# Patient Record
Sex: FEMALE | Race: WHITE | Employment: FULL TIME | URBAN - METROPOLITAN AREA
[De-identification: names, ages, dates, MRNs, and addresses within clinical notes are randomized per-mention and may not be internally consistent; named-entity substitution may affect disease eponyms.]

---

## 2017-03-23 PROBLEM — M19.019 AC (ACROMIOCLAVICULAR) ARTHRITIS: Status: ACTIVE | Noted: 2017-03-23

## 2018-07-13 ENCOUNTER — OFFICE VISIT (OUTPATIENT)
Dept: PODIATRY CLINIC | Facility: CLINIC | Age: 38
End: 2018-07-13

## 2018-07-13 DIAGNOSIS — M21.621 TAILOR'S BUNIONETTE, RIGHT: Primary | ICD-10-CM

## 2018-07-13 DIAGNOSIS — M77.51 BURSITIS OF RIGHT FOOT: ICD-10-CM

## 2018-07-13 PROCEDURE — 99212 OFFICE O/P EST SF 10 MIN: CPT | Performed by: PODIATRIST

## 2018-07-14 NOTE — PROGRESS NOTES
Selena Brooks is a 44year old female. Patient presents with: Foot Injury: Pt is here for a right foot injury - follow up visit. Pt had a cortisone injection a few weeks ago for bursitis. No pain.   Pain free for about 2-3 weeks        HPI:   Patient retu heartburn  NEURO: denies headaches    EXAM:   There were no vitals taken for this visit. Physical Exam  GENERAL: well developed, well nourished, in no apparent distress  EXTREMITIES:, Right foot   1.  Integument: Skin on the right foot was examined it is w

## 2018-07-20 PROCEDURE — 88305 TISSUE EXAM BY PATHOLOGIST: CPT | Performed by: RADIOLOGY

## 2018-09-25 ENCOUNTER — APPOINTMENT (OUTPATIENT)
Dept: LAB | Age: 38
End: 2018-09-25
Attending: SURGERY

## 2018-09-25 PROCEDURE — 88342 IMHCHEM/IMCYTCHM 1ST ANTB: CPT

## 2018-09-25 PROCEDURE — 88305 TISSUE EXAM BY PATHOLOGIST: CPT

## 2018-11-07 ENCOUNTER — OFFICE VISIT (OUTPATIENT)
Dept: PODIATRY CLINIC | Facility: CLINIC | Age: 38
End: 2018-11-07
Payer: COMMERCIAL

## 2018-11-07 VITALS — DIASTOLIC BLOOD PRESSURE: 90 MMHG | SYSTOLIC BLOOD PRESSURE: 144 MMHG | HEART RATE: 85 BPM

## 2018-11-07 DIAGNOSIS — M72.2 PLANTAR FASCIITIS OF LEFT FOOT: Primary | ICD-10-CM

## 2018-11-07 PROCEDURE — 20550 NJX 1 TENDON SHEATH/LIGAMENT: CPT | Performed by: PODIATRIST

## 2018-11-07 PROCEDURE — 99213 OFFICE O/P EST LOW 20 MIN: CPT | Performed by: PODIATRIST

## 2018-11-07 RX ORDER — TRIAMCINOLONE ACETONIDE 40 MG/ML
40 INJECTION, SUSPENSION INTRA-ARTICULAR; INTRAMUSCULAR ONCE
Status: SHIPPED | OUTPATIENT
Start: 2018-11-07 | End: 2038-11-07

## 2018-11-08 NOTE — PROGRESS NOTES
Tiesha Dickerson is a 43year old female. Patient presents with:   Follow - Up: left heel spur one month, stretching and icing with no improvement pain 6/10        HPI:   Chief complaint of a painful left heel bothering her now for a few weeks she has been st and Sexual Activity      Alcohol use: Yes        Comment: occasional      Drug use: No      Sexual activity: Not on file    Other Topics      Concerns:        Not on file    Social History Narrative      Not on file          REVIEW OF SYSTEMS:   Review of

## 2018-12-28 ENCOUNTER — TELEPHONE (OUTPATIENT)
Dept: PODIATRY CLINIC | Facility: CLINIC | Age: 38
End: 2018-12-28

## 2018-12-28 NOTE — TELEPHONE ENCOUNTER
Per [facility] ok to add on to 1/2 schedule. Gave pt appt on 1/2/19 @ 12:55pm [facility] office. Need to DB this appt. Will need to get access to DB on 12/31.

## 2018-12-28 NOTE — TELEPHONE ENCOUNTER
Pt states WMN informed her to come see him on Wednesday 1/2/18. No appointments available. Please advise.

## 2019-01-09 ENCOUNTER — TELEPHONE (OUTPATIENT)
Dept: PODIATRY CLINIC | Facility: CLINIC | Age: 39
End: 2019-01-09

## 2019-01-09 ENCOUNTER — OFFICE VISIT (OUTPATIENT)
Dept: PODIATRY CLINIC | Facility: CLINIC | Age: 39
End: 2019-01-09
Payer: COMMERCIAL

## 2019-01-09 DIAGNOSIS — S86.312A PERONEAL TENDON TEAR, LEFT, INITIAL ENCOUNTER: Primary | ICD-10-CM

## 2019-01-09 PROCEDURE — 99213 OFFICE O/P EST LOW 20 MIN: CPT | Performed by: PODIATRIST

## 2019-01-09 RX ORDER — MULTIVIT-MIN/IRON FUM/FOLIC AC 7.5 MG-4
1 TABLET ORAL DAILY
COMMUNITY

## 2019-01-09 RX ORDER — MULTIVIT-MIN/IRON/FOLIC ACID/K 18-600-40
CAPSULE ORAL
COMMUNITY

## 2019-01-09 NOTE — TELEPHONE ENCOUNTER
Dr. Raz Martínez ordered MRI right foot and pt has [employer] PPO. Pt informed this will need PA and a nurse will do this for her.  Advised pt to wait until she hears from nurse if auth or not before scheduling test. Pt will call ortho office to notify of which site s

## 2019-01-09 NOTE — PROGRESS NOTES
Ellie Goldberg is a 43year old female. Patient presents with:  Heel Pain: Left -- Pt states that on 12/22/18 she was walking and felt a pop with instant burning pain. Rates pain 4/10 at this time.          HPI:   Patient presents to the clinic during the h name: Not on file      Number of children: Not on file      Years of education: Not on file      Highest education level: Not on file    Tobacco Use      Smoking status: Former Smoker        Quit date: 2013        Years since quittin.0      Smokele will order an MRI to see the Promius longus tendon on the left foot. Patient understood she make an appointment to see me after the MRI is completed. The patient indicates understanding of these issues and agrees to the plan. Return for after MRI.

## 2019-01-11 ENCOUNTER — TELEPHONE (OUTPATIENT)
Dept: PODIATRY CLINIC | Facility: CLINIC | Age: 39
End: 2019-01-11

## 2019-01-11 DIAGNOSIS — M79.672 PAIN OF LEFT HEEL: Primary | ICD-10-CM

## 2019-01-15 ENCOUNTER — TELEPHONE (OUTPATIENT)
Dept: ORTHOPEDICS CLINIC | Facility: CLINIC | Age: 39
End: 2019-01-15

## 2019-01-15 NOTE — TELEPHONE ENCOUNTER
Called GREG and s/w Cyndie Gatica and he states pt will require a PA for MRI left foot at [address] [place] [address]. Called Reina and s/w MsEdgar Lars Elisesidney to initiate PA for MRI left foot.  She states pt cannot be found and since pt has commercial plan they should cont

## 2019-01-16 ENCOUNTER — PATIENT MESSAGE (OUTPATIENT)
Dept: PODIATRY CLINIC | Facility: CLINIC | Age: 39
End: 2019-01-16

## 2019-01-16 NOTE — TELEPHONE ENCOUNTER
From: Wicho Mascorro  To: Stephen Sierra DPM  Sent: 1/16/2019 9:10 AM CST  Subject: Visit Sara Chapa,    I had the MRI of my foot yesterday, 1/15, at [facility].  I called my insurance, it was more beneficial for me to have it here t

## 2019-01-18 NOTE — TELEPHONE ENCOUNTER
Spoke to pt and she states that her left foot pain is getting worse. Rates pain 8/10 and pain comes and goes. States she has shooting pains. Outside of ankle has swelling. Took Advil 400 - 600 mg twice this week.  Pt has not been icing because it causes hi

## 2019-01-21 NOTE — TELEPHONE ENCOUNTER
Please call in a prescription for tramadol as follows  Tramadol 50 mg tablets  Dispense 30  Take 1 or 2 by mouth every 4-6 hours as needed for foot pain only no refills authorized generic acceptable

## 2019-01-22 NOTE — TELEPHONE ENCOUNTER
Please sign tramadol order placed in the system. Tried to call in today. Message left with pharmacy for call back.

## 2019-01-23 ENCOUNTER — OFFICE VISIT (OUTPATIENT)
Dept: PODIATRY CLINIC | Facility: CLINIC | Age: 39
End: 2019-01-23
Payer: COMMERCIAL

## 2019-01-23 DIAGNOSIS — M79.672 PAIN OF LEFT HEEL: Primary | ICD-10-CM

## 2019-01-23 DIAGNOSIS — S86.312A PERONEAL TENDON TEAR, LEFT, INITIAL ENCOUNTER: ICD-10-CM

## 2019-01-23 DIAGNOSIS — M72.2 PLANTAR FASCIITIS OF LEFT FOOT: ICD-10-CM

## 2019-01-23 PROCEDURE — 99213 OFFICE O/P EST LOW 20 MIN: CPT | Performed by: PODIATRIST

## 2019-01-23 RX ORDER — TRAMADOL HYDROCHLORIDE 50 MG/1
TABLET ORAL
Qty: 30 TABLET | Refills: 0 | OUTPATIENT
Start: 2019-01-23 | End: 2019-05-20

## 2019-01-23 RX ORDER — TRAMADOL HYDROCHLORIDE 50 MG/1
TABLET ORAL
Refills: 0 | COMMUNITY
Start: 2019-01-22 | End: 2019-05-20

## 2019-01-27 NOTE — PROGRESS NOTES
Riley Wilson is a 43year old female. Patient presents with: Foot Pain: Left f/u and MRI results - states she has pain rated as 8/10 on and off         HPI:   Patient returns to the clinic for follow-up on her left foot pain and MRI results.   Patient is Highest education level: Not on file    Tobacco Use      Smoking status: Former Smoker        Quit date: 2013        Years since quittin.0      Smokeless tobacco: Never Used      Tobacco comment: smoked 10 years    Substance and Sexual Activity

## 2019-11-20 PROBLEM — M72.2 PLANTAR FASCIITIS OF LEFT FOOT: Status: ACTIVE | Noted: 2019-11-20

## 2021-10-28 ENCOUNTER — GENETICS ENCOUNTER (OUTPATIENT)
Dept: GENETICS | Facility: HOSPITAL | Age: 41
End: 2021-10-28
Attending: GENETIC COUNSELOR, MS
Payer: COMMERCIAL

## 2021-10-28 ENCOUNTER — APPOINTMENT (OUTPATIENT)
Dept: HEMATOLOGY/ONCOLOGY | Facility: HOSPITAL | Age: 41
End: 2021-10-28
Attending: INTERNAL MEDICINE
Payer: COMMERCIAL

## 2021-10-28 DIAGNOSIS — Z80.3 FAMILY HISTORY OF MALIGNANT NEOPLASM OF BREAST: Primary | ICD-10-CM

## 2021-10-28 PROCEDURE — 96040 HC GENETIC COUNSELING EA 30 MIN: CPT | Performed by: GENETIC COUNSELOR, MS

## 2021-10-28 NOTE — PROGRESS NOTES
Reason for visit: Mrs. Palma is a 54-year-old woman referred for genetic counseling due to a family history of early-onset breast cancer. She was seen today for genetic counseling and to discuss the option of genetic testing.   She has been consistent wit to 12 years use of oral contraceptives. Mrs. Palma admits to former tobacco usage and admits to alcohol consumption of 3-5 drinks weekly. She has not yet had a colonoscopy. She considers herself in overall good health.     Summary:   We discussed tanner benefits and limitations of BRCA1/2 testing versus multi-gene panels that include BRCA1/2.   Panels are an appropriate option for individuals whose history is suggestive of more than one syndrome, and they improve detection rate for identifying the underlyi covered benefit for her. After discussing the multiple testing options, Mrs. Carlos Epstein decided that she would like to continue to consider the option of molecular genetic testing understands that her mother is the more preferred individual to start with.

## 2022-01-04 PROBLEM — M23.92 INTERNAL DERANGEMENT OF KNEE, LEFT: Status: ACTIVE | Noted: 2022-01-04

## 2022-02-21 PROBLEM — Z98.890 S/P LEFT KNEE ARTHROSCOPY: Status: ACTIVE | Noted: 2022-02-21

## 2022-02-21 PROBLEM — S80.02XA CONTUSION OF LEFT KNEE, INITIAL ENCOUNTER: Status: ACTIVE | Noted: 2022-02-21

## 2022-12-15 ENCOUNTER — GENETICS ENCOUNTER (OUTPATIENT)
Dept: GENETICS | Facility: HOSPITAL | Age: 42
End: 2022-12-15
Attending: GENETIC COUNSELOR, MS
Payer: COMMERCIAL

## 2022-12-15 ENCOUNTER — NURSE ONLY (OUTPATIENT)
Dept: HEMATOLOGY/ONCOLOGY | Facility: HOSPITAL | Age: 42
End: 2022-12-15
Attending: GENETIC COUNSELOR, MS
Payer: COMMERCIAL

## 2022-12-15 DIAGNOSIS — Z80.3 FAMILY HISTORY OF MALIGNANT NEOPLASM OF BREAST: Primary | ICD-10-CM

## 2022-12-15 DIAGNOSIS — Z85.820 PERSONAL HISTORY OF MALIGNANT MELANOMA: ICD-10-CM

## 2022-12-15 PROCEDURE — 96040 HC GENETIC COUNSELING EA 30 MIN: CPT | Performed by: GENETIC COUNSELOR, MS

## 2022-12-15 PROCEDURE — 36415 COLL VENOUS BLD VENIPUNCTURE: CPT

## 2022-12-15 NOTE — PROGRESS NOTES
Met with Severino Valenzuela again (originally met 10/28/2021) to review option of genetic testing. She shares that earlier this year she was found to have a melanoma on her back which was surgically resected and more recently, a breast biopsy yielded ADH and she had a lumpectomy, the pathology results of which are pending. She is concerned about her personal risk for breast cancer given her mother's early age at diagnosis and is now desiring to pursue genetic testing, as her mother has not had update testing performed. We reviewed benefits, risks and limitations of the testing as well as various options that exist. She elected to pursue testing through [facility] for a panel of both melanoma genes and breast cancer genes. Blood was drawn and sent to [facility]. Results are anticipated in 2-3 weeks timeframe and she will be contacted with them. Her family history is otherwise unchanged from her last visit. All her questions were answered to the best of my ability.

## 2022-12-28 ENCOUNTER — TELEPHONE (OUTPATIENT)
Dept: GENETICS | Facility: HOSPITAL | Age: 42
End: 2022-12-28

## 2022-12-28 NOTE — TELEPHONE ENCOUNTER
Shared NEGATIVE genetic testing results with Rosa Isela Wilcox over phone. She had opted for a breast and melanoma focused panel through [facility] and all genes yielded negative results. She was reassured to hear this. Reminded her that she should still be followed closely given unknown etiology of cancer in family. I will mail her a copy of the results and she was appreciative of the call.

## 2023-09-09 RX ORDER — LOSARTAN POTASSIUM 50 MG/1
50 TABLET ORAL NIGHTLY
COMMUNITY

## 2023-09-14 ENCOUNTER — ANESTHESIA EVENT (OUTPATIENT)
Dept: SURGERY | Facility: HOSPITAL | Age: 43
End: 2023-09-14
Payer: COMMERCIAL

## 2023-09-14 ENCOUNTER — HOSPITAL ENCOUNTER (OUTPATIENT)
Facility: HOSPITAL | Age: 43
Setting detail: HOSPITAL OUTPATIENT SURGERY
Discharge: HOME | End: 2023-09-14
Attending: ORTHOPAEDIC SURGERY | Admitting: ORTHOPAEDIC SURGERY
Payer: COMMERCIAL

## 2023-09-14 ENCOUNTER — ANESTHESIA (OUTPATIENT)
Dept: SURGERY | Facility: HOSPITAL | Age: 43
End: 2023-09-14
Payer: COMMERCIAL

## 2023-09-14 VITALS
BODY MASS INDEX: 41.95 KG/M2 | SYSTOLIC BLOOD PRESSURE: 151 MMHG | TEMPERATURE: 98 F | DIASTOLIC BLOOD PRESSURE: 94 MMHG | OXYGEN SATURATION: 99 % | WEIGHT: 293 LBS | HEART RATE: 66 BPM | RESPIRATION RATE: 18 BRPM | HEIGHT: 70 IN

## 2023-09-14 LAB — B-HCG UR QL: NEGATIVE

## 2023-09-14 PROCEDURE — 81025 URINE PREGNANCY TEST: CPT

## 2023-09-14 PROCEDURE — 0SBC4ZZ EXCISION OF RIGHT KNEE JOINT, PERCUTANEOUS ENDOSCOPIC APPROACH: ICD-10-PCS | Performed by: ORTHOPAEDIC SURGERY

## 2023-09-14 RX ORDER — SODIUM CHLORIDE, SODIUM LACTATE, POTASSIUM CHLORIDE, CALCIUM CHLORIDE 600; 310; 30; 20 MG/100ML; MG/100ML; MG/100ML; MG/100ML
INJECTION, SOLUTION INTRAVENOUS CONTINUOUS
Status: DISCONTINUED | OUTPATIENT
Start: 2023-09-14 | End: 2023-09-14

## 2023-09-14 RX ORDER — FAMOTIDINE 20 MG/1
20 TABLET, FILM COATED ORAL ONCE
Status: COMPLETED | OUTPATIENT
Start: 2023-09-14 | End: 2023-09-14

## 2023-09-14 RX ORDER — ONDANSETRON 2 MG/ML
4 INJECTION INTRAMUSCULAR; INTRAVENOUS EVERY 6 HOURS PRN
Status: DISCONTINUED | OUTPATIENT
Start: 2023-09-14 | End: 2023-09-14

## 2023-09-14 RX ORDER — ACETAMINOPHEN 500 MG
1000 TABLET ORAL ONCE
Status: COMPLETED | OUTPATIENT
Start: 2023-09-14 | End: 2023-09-14

## 2023-09-14 RX ORDER — DEXAMETHASONE SODIUM PHOSPHATE 4 MG/ML
VIAL (ML) INJECTION AS NEEDED
Status: DISCONTINUED | OUTPATIENT
Start: 2023-09-14 | End: 2023-09-14 | Stop reason: SURG

## 2023-09-14 RX ORDER — METOCLOPRAMIDE 10 MG/1
10 TABLET ORAL ONCE
Status: COMPLETED | OUTPATIENT
Start: 2023-09-14 | End: 2023-09-14

## 2023-09-14 RX ORDER — HYDROMORPHONE HYDROCHLORIDE 1 MG/ML
0.2 INJECTION, SOLUTION INTRAMUSCULAR; INTRAVENOUS; SUBCUTANEOUS EVERY 5 MIN PRN
Status: DISCONTINUED | OUTPATIENT
Start: 2023-09-14 | End: 2023-09-14

## 2023-09-14 RX ORDER — ONDANSETRON 2 MG/ML
INJECTION INTRAMUSCULAR; INTRAVENOUS AS NEEDED
Status: DISCONTINUED | OUTPATIENT
Start: 2023-09-14 | End: 2023-09-14 | Stop reason: SURG

## 2023-09-14 RX ORDER — MORPHINE SULFATE 4 MG/ML
4 INJECTION, SOLUTION INTRAMUSCULAR; INTRAVENOUS EVERY 10 MIN PRN
Status: DISCONTINUED | OUTPATIENT
Start: 2023-09-14 | End: 2023-09-14

## 2023-09-14 RX ORDER — NALOXONE HYDROCHLORIDE 0.4 MG/ML
0.08 INJECTION, SOLUTION INTRAMUSCULAR; INTRAVENOUS; SUBCUTANEOUS AS NEEDED
Status: DISCONTINUED | OUTPATIENT
Start: 2023-09-14 | End: 2023-09-14

## 2023-09-14 RX ORDER — HYDROMORPHONE HYDROCHLORIDE 1 MG/ML
0.4 INJECTION, SOLUTION INTRAMUSCULAR; INTRAVENOUS; SUBCUTANEOUS EVERY 5 MIN PRN
Status: DISCONTINUED | OUTPATIENT
Start: 2023-09-14 | End: 2023-09-14

## 2023-09-14 RX ORDER — KETOROLAC TROMETHAMINE 30 MG/ML
INJECTION, SOLUTION INTRAMUSCULAR; INTRAVENOUS AS NEEDED
Status: DISCONTINUED | OUTPATIENT
Start: 2023-09-14 | End: 2023-09-14 | Stop reason: SURG

## 2023-09-14 RX ORDER — MIDAZOLAM HYDROCHLORIDE 1 MG/ML
INJECTION INTRAMUSCULAR; INTRAVENOUS AS NEEDED
Status: DISCONTINUED | OUTPATIENT
Start: 2023-09-14 | End: 2023-09-14 | Stop reason: SURG

## 2023-09-14 RX ORDER — GLYCOPYRROLATE 0.2 MG/ML
INJECTION, SOLUTION INTRAMUSCULAR; INTRAVENOUS AS NEEDED
Status: DISCONTINUED | OUTPATIENT
Start: 2023-09-14 | End: 2023-09-14 | Stop reason: SURG

## 2023-09-14 RX ORDER — LIDOCAINE HYDROCHLORIDE 10 MG/ML
INJECTION, SOLUTION EPIDURAL; INFILTRATION; INTRACAUDAL; PERINEURAL AS NEEDED
Status: DISCONTINUED | OUTPATIENT
Start: 2023-09-14 | End: 2023-09-14 | Stop reason: SURG

## 2023-09-14 RX ORDER — HYDROMORPHONE HYDROCHLORIDE 1 MG/ML
0.6 INJECTION, SOLUTION INTRAMUSCULAR; INTRAVENOUS; SUBCUTANEOUS EVERY 5 MIN PRN
Status: DISCONTINUED | OUTPATIENT
Start: 2023-09-14 | End: 2023-09-14

## 2023-09-14 RX ORDER — CEFAZOLIN SODIUM IN 0.9 % NACL 3 G/100 ML
INTRAVENOUS SOLUTION, PIGGYBACK (ML) INTRAVENOUS AS NEEDED
Status: DISCONTINUED | OUTPATIENT
Start: 2023-09-14 | End: 2023-09-14 | Stop reason: SURG

## 2023-09-14 RX ORDER — MORPHINE SULFATE 10 MG/ML
6 INJECTION, SOLUTION INTRAMUSCULAR; INTRAVENOUS EVERY 10 MIN PRN
Status: DISCONTINUED | OUTPATIENT
Start: 2023-09-14 | End: 2023-09-14

## 2023-09-14 RX ORDER — BUPIVACAINE HYDROCHLORIDE 2.5 MG/ML
INJECTION, SOLUTION EPIDURAL; INFILTRATION; INTRACAUDAL AS NEEDED
Status: DISCONTINUED | OUTPATIENT
Start: 2023-09-14 | End: 2023-09-14 | Stop reason: HOSPADM

## 2023-09-14 RX ORDER — MORPHINE SULFATE 4 MG/ML
2 INJECTION, SOLUTION INTRAMUSCULAR; INTRAVENOUS EVERY 10 MIN PRN
Status: DISCONTINUED | OUTPATIENT
Start: 2023-09-14 | End: 2023-09-14

## 2023-09-14 RX ADMIN — MIDAZOLAM HYDROCHLORIDE 2 MG: 1 INJECTION INTRAMUSCULAR; INTRAVENOUS at 10:32:00

## 2023-09-14 RX ADMIN — LIDOCAINE HYDROCHLORIDE 50 MG: 10 INJECTION, SOLUTION EPIDURAL; INFILTRATION; INTRACAUDAL; PERINEURAL at 10:26:00

## 2023-09-14 RX ADMIN — DEXAMETHASONE SODIUM PHOSPHATE 4 MG: 4 MG/ML VIAL (ML) INJECTION at 10:26:00

## 2023-09-14 RX ADMIN — KETOROLAC TROMETHAMINE 30 MG: 30 INJECTION, SOLUTION INTRAMUSCULAR; INTRAVENOUS at 11:12:00

## 2023-09-14 RX ADMIN — ONDANSETRON 4 MG: 2 INJECTION INTRAMUSCULAR; INTRAVENOUS at 10:26:00

## 2023-09-14 RX ADMIN — SODIUM CHLORIDE, SODIUM LACTATE, POTASSIUM CHLORIDE, CALCIUM CHLORIDE: 600; 310; 30; 20 INJECTION, SOLUTION INTRAVENOUS at 10:24:00

## 2023-09-14 RX ADMIN — GLYCOPYRROLATE 0.2 MG: 0.2 INJECTION, SOLUTION INTRAMUSCULAR; INTRAVENOUS at 10:26:00

## 2023-09-14 RX ADMIN — CEFAZOLIN SODIUM IN 0.9 % NACL 3 G: 3 G/100 ML INTRAVENOUS SOLUTION, PIGGYBACK (ML) INTRAVENOUS at 10:30:00

## 2023-09-14 RX ADMIN — SODIUM CHLORIDE, SODIUM LACTATE, POTASSIUM CHLORIDE, CALCIUM CHLORIDE: 600; 310; 30; 20 INJECTION, SOLUTION INTRAVENOUS at 11:10:00

## 2023-09-14 NOTE — DISCHARGE INSTRUCTIONS
HOME INSTRUCTIONS  Follow up with office as scheduled   Keep dressing dry until Saturday. On Saturday okay to remove dressing. May shower after dressing is removed   Cover incisions with band aids   Weight Bearing as tolerated   Use Crutches if needed   AMBSURG HOME CARE INSTRUCTIONS: POST-OP ANESTHESIA  The medication that you received for sedation or general anesthesia can last up to 24 hours. Your judgment and reflexes may be altered, even if you feel like your normal self. We Recommend:   Do not drive any motor vehicle or bicycle   Avoid mowing the lawn, playing sports, or working with power tools/applicances (power saws, electric knives or mixers)   That you have someone stay with you on your first night home   Do not drink alcohol or take sleeping pills or tranquilizers   Do not sign legal documents within 24 hours of your procedure   If you had a nerve block for your surgery, take extra care not to put any pressure on your arm or hand for 24 hours    It is normal:  For you to have a sore throat if you had a breathing tube during surgery (while you were asleep!). The sore throat should get better within 48 hours. You can gargle with warm salt water (1/2 tsp in 4 oz warm water) or use a throat lozenge for comfort  To feel muscle aches or soreness especially in the abdomen, chest or neck. The achy feeling should go away in the next 24 hours  To feel weak, sleepy or \"wiped out\". Your should start feeling better in the next 24 hours. To experience mild discomforts such as sore lip or tongue, headache, cramps, gas pains or a bloated feeling in your abdomen. To experience mild back pain or soreness for a day or two if you had spinal or epidural anesthesia. If you had laparoscopic surgery, to feel shoulder pain or discomfort on the day of surgery. For some patients to have nausea after surgery/anesthesia    If you feel nausea or experience vomiting:   Try to move around less.    Eat less than usual or drink only liquids until the next morning   Nausea should resolve in about 24 hours    If you have a problem when you are at home:    Call your surgeons office

## 2023-09-14 NOTE — OPERATIVE REPORT
[facility]    PATIENT'S NAME: Antoine Rodrigues   ATTENDING PHYSICIAN: Lisandro Sultana MD   OPERATING PHYSICIAN: Lisandro Sultana MD   PATIENT ACCOUNT#:   [id number]    LOCATION:  [address]  MEDICAL RECORD #:   [id number]       YOB: 1980  ADMISSION DATE:       09/14/2023      OPERATION DATE:  09/14/2023    OPERATIVE REPORT      PREOPERATIVE DIAGNOSIS:  Right knee medial meniscus tear, osteoarthritis, morbid obesity. POSTOPERATIVE DIAGNOSIS:  Right knee medial meniscus tear, osteoarthritis, morbid obesity. PROCEDURE PERFORMED:  Right knee arthroscopy, partial medial meniscectomy, chondroplasty of the patella and trochlea. COMPLICATIONS:  None. ANESTHESIA:  General.    SPECIMENS REMOVED:  None sent to Pathology. IMPLANTS USED:  None. BLOOD LOSS:  10 mL. INDICATIONS:  The patient is a pleasant 56-year-old woman who has failed nonoperative treatment of meniscal tear and arthritis on the right. We had discussed risks and benefits of operative intervention going forward with the surgery to include infection, stiffness, neurovascular injury, anesthetic risk, continued pain, possible need for further surgery, DVT, PE. She understood these risks and decided to proceed. OPERATIVE TECHNIQUE:  After adequate induction of general anesthesia, the right lower extremity was prepped and draped in sterile fashion after the application of a well-padded tourniquet to right upper thigh. Prior to the case, the leg was exsanguinated and tourniquet taken up to 300 mmHg. At this point, a standard anterolateral portal was made. Patellofemoral joint was entered. Medial portal was made under direct visualization. Thorough evaluation of the patellofemoral joint revealed grade 2 and areas of grade chondromalacia of the patella and trochlea. This was debrided back to a stable base with a shaver.   Medial compartment revealed grade 2 chondromalacia and some areas of grade 3 that was lightly debrided as well. There was a tear of the posterior horn at the level of the root. This was debrided to a stable base with biter and shaver. Notch revealed intact ACL and PCL. Lateral compartment revealed no chondromalacia and no meniscal tear. Gutters revealed no loose bodies. At this time, the joint was irrigated thoroughly. All instruments were removed. Portals were closed with 3-0 nylon. Portals were injected with 0.25% Marcaine plain. Sterile dressings were applied. The patient tolerated the procedure well. She was taken to the PACU in stable condition. Please note that prior to the case a time-out was completed, correct site was identified, and preoperative antibiotics were given. Please also note an added level of difficulty was encountered given the patient's morbid obesity and difficulty with positioning and instrumentation. Dictated By Myra Alba MD  d: 09/14/2023 11:20:05  t: 09/14/2023 14:23:49  Roberto Guard [id number]  AES/

## 2023-09-14 NOTE — H&P
History & Physical Examination    Patient Name: Zacarias Esqueda  MRN: [id number]  CSN: [id number]  YOB: 1980    Diagnosis: RIGHT KNEE MMT, OA    Present Illness: FAILED CONSERVATIVE MEASURES    losartan 50 MG Oral Tab, Take 1 tablet (50 mg total) by mouth at bedtime. , Disp: , Rfl: , 9/13/2023 at 2100  Omega-3 Fatty Acids (FISH OIL OR), Take by mouth., Disp: , Rfl: , 9/7/2023  Multiple Vitamins-Minerals (MULTI-VITAMIN/MINERALS) Oral Tab, Take 1 tablet by mouth daily. , Disp: , Rfl: , 9/7/2023      lactated ringers infusion, , Intravenous, Continuous        Allergies: No Known Allergies    Past Medical History:   Diagnosis Date    Cancer [facility] 05/2022    Melanoma Stage 1 Upper left back    High blood pressure      Past Surgical History:   Procedure Laterality Date    ARTHROSCOPY OF JOINT UNLISTED Left     knee scope     HERNIA SURGERY      umbilical     LUMPECTOMY RIGHT  09/2018    Ductal Hyperplasia.  No lymph nodes removed    LUMPECTOMY RIGHT  12/2022    Ductal Hyperplasia    OTHER SURGICAL HISTORY Left 2000, 2003    A-PMM x2    OTHER SURGICAL HISTORY Right 08/2011    wrist ganglion cyst removed    OTHER SURGICAL HISTORY Left 2005, 2007    foot ganglion cyst removed x2    OTHER SURGICAL HISTORY  09/25/2018    Right breast lumpectomy, right bresat deep margin excision    TONSILLECTOMY       Family History   Problem Relation Age of Onset    Diabetes Father     Breast Cancer Mother 52        DCIS, ER+; BRCA1/2 neg 2004    Cancer Maternal Grandmother 79        lung ca; smoker    Cancer Paternal Grandfather 68        leukemia     Social History    Tobacco Use      Smoking status: Former        Types: Cigarettes        Quit date: 1/1/2013        Years since quitting: 10.7      Smokeless tobacco: Never      Tobacco comments: smoked 10 years    Alcohol use: Yes      Comment: occasional/ 1-2 drinks a week       SYSTEM Check if Review is Normal Check if Physical Exam is Normal If not normal, please explain:   HEENT [ x] [ x]    NECK & BACK [ x] [ x]    HEART [ x] [ x]    LUNGS [ x] [ x]    ABDOMEN [ x] [ x]    UROGENITAL [ x] [ x]    EXTREMITIES [ ] [ ] R +MJLT, +Mcmurrays   OTHER        [ x ] I have discussed the risks and benefits and alternatives with the patient/family. They understand and agree to proceed with plan of care. [ x ] I have reviewed the History and Physical done within the last 30 days. Any changes noted above.     PLAN: RIGHT KNEE SCOPE    Tha Coy MD  9/14/2023  9:45 AM

## 2025-04-17 RX ORDER — MULTIVIT-MIN/IRON/FOLIC ACID/K 18-600-40
CAPSULE ORAL
COMMUNITY

## 2025-04-25 ENCOUNTER — HOSPITAL ENCOUNTER (OUTPATIENT)
Facility: HOSPITAL | Age: 45
Setting detail: HOSPITAL OUTPATIENT SURGERY
Discharge: HOME | End: 2025-04-25
Attending: INTERNAL MEDICINE | Admitting: INTERNAL MEDICINE
Payer: COMMERCIAL

## 2025-04-25 ENCOUNTER — ANESTHESIA EVENT (OUTPATIENT)
Dept: ENDOSCOPY | Facility: HOSPITAL | Age: 45
End: 2025-04-25
Payer: COMMERCIAL

## 2025-04-25 ENCOUNTER — ANESTHESIA (OUTPATIENT)
Dept: ENDOSCOPY | Facility: HOSPITAL | Age: 45
End: 2025-04-25
Payer: COMMERCIAL

## 2025-04-25 VITALS
OXYGEN SATURATION: 98 % | HEART RATE: 65 BPM | DIASTOLIC BLOOD PRESSURE: 54 MMHG | RESPIRATION RATE: 21 BRPM | HEIGHT: 70 IN | SYSTOLIC BLOOD PRESSURE: 115 MMHG | BODY MASS INDEX: 41.95 KG/M2 | WEIGHT: 293 LBS

## 2025-04-25 LAB — B-HCG UR QL: NEGATIVE

## 2025-04-25 PROCEDURE — 81025 URINE PREGNANCY TEST: CPT

## 2025-04-25 RX ORDER — LIDOCAINE HYDROCHLORIDE 10 MG/ML
INJECTION, SOLUTION EPIDURAL; INFILTRATION; INTRACAUDAL; PERINEURAL AS NEEDED
Status: DISCONTINUED | OUTPATIENT
Start: 2025-04-25 | End: 2025-04-25 | Stop reason: SURG

## 2025-04-25 RX ORDER — SODIUM CHLORIDE, SODIUM LACTATE, POTASSIUM CHLORIDE, CALCIUM CHLORIDE 600; 310; 30; 20 MG/100ML; MG/100ML; MG/100ML; MG/100ML
INJECTION, SOLUTION INTRAVENOUS CONTINUOUS
Status: DISCONTINUED | OUTPATIENT
Start: 2025-04-25 | End: 2025-04-25

## 2025-04-25 RX ADMIN — LIDOCAINE HYDROCHLORIDE 50 MG: 10 INJECTION, SOLUTION EPIDURAL; INFILTRATION; INTRACAUDAL; PERINEURAL at 08:10:00

## 2025-04-25 NOTE — H&P
HISTORY AND PHYSICAL FOR ENDOSCOPIC PROCEDURES      Lily Palma Patient Status:  Hospital Outpatient Surgery    1980 MRN [id number]   Location [facility] ENDOSCOPY LAB SUITES Attending Milady Aquino MD   Hosp Day # 0 PCP Navarro Linares MD     Date of Initial Consult:  today  Reason for Consultation:  screening    Scheduled Procedure: Colonoscopy  Indications for Procedure: Screening    History of Present Illness:  Lily Palma is a/a(n) 44 year old female who presented with complaints as stated above.    Medical History:  Past Medical History[1]  PtPast Surgical History[2]   reports that she quit smoking about 12 years ago. Her smoking use included cigarettes. She has never used smokeless tobacco. She reports current alcohol use. She reports that she does not use drugs.  Family History[3]    Allergies:  Allergies[4]    Medications:  Current Hospital Medications[5]    Anticoagulants: None    History of Anesthesia Complications: None    Review of Systems:  General: Negative other than what was specified in the HPI  Respiratory: Negative other than what was specified in the HPI  Cardiovascular: Negative other than what was specified in the HPI  Gastrointestinal: Negative other than what was specified in the HPI  Neurological: Negative other than what was specified in the HPI    Physical Exam:  General: AAOx3 in NAD  HEENT: No scleral icterus, no LAD  Lungs: CTA bilaterally, no wheezing or crackles  CV: RRR S1S2, no murmurs or rubs  Abdomen: Normal active bowel sounds, soft, nontender, nondistended, no rebound or guarding  Extremities: No edema or discoloration    Assessment and Plan:  Problem List[6]      A. ASA Classification: 1. Normal healthy patient    B.  The sedation plan was explained to the patient.  The risks, benefits and alternatives to the aforementioned endoscopic evaluation(s), including but not limited to: infection, bleeding, aspiration, perforation, adverse medications reaction,  missed diagnosis and missed lesions, were explained to the patient and/or family and/or POA and they voiced their understanding and agreed to undergo the procedure(s). They were given the opportunity to ask questions and all inquiries were addressed.    C.  Sedation Plan: Monitored Anesthesia Care      Milady Aquino MD  4/25/2025  8:05 AM         [1]   Past Medical History:   Cancer ([facility])    Melanoma Stage 1 Upper left back    High blood pressure    Visual impairment    contacts/glasses   [2]   Past Surgical History:  Procedure Laterality Date    Arthroscopy of joint unlisted Left     knee scope     Hernia surgery      umbilical     Lumpectomy right  09/2018    Ductal Hyperplasia. No lymph nodes removed    Lumpectomy right  12/2022    Ductal Hyperplasia    Other surgical history Left 2000, 2003    A-PMM x2    Other surgical history Right 08/2011    wrist ganglion cyst removed    Other surgical history Left 2005, 2007    foot ganglion cyst removed x2    Other surgical history  09/25/2018    Right breast lumpectomy, right bresat deep margin excision    Tonsillectomy     [3]   Family History  Problem Relation Age of Onset    Diabetes Father     Breast Cancer Mother 49        DCIS, ER+; BRCA1/2 neg 2004    Cancer Maternal Grandmother 70        lung ca; smoker    Cancer Paternal Grandfather 77        leukemia   [4] No Known Allergies  [5]   Current Facility-Administered Medications:     lactated ringers infusion, , Intravenous, Continuous  [6]   Patient Active Problem List  Diagnosis    Spasm of cervical paraspinous muscle    AC (acromioclavicular) joint arthritis    Rotator cuff impingement syndrome, left    Neck pain on left side    Shoulder instability, right    Cervical radicular pain    Right shoulder pain, unspecified chronicity    AC (acromioclavicular) arthritis    Plantar fasciitis of left foot    Family history of malignant neoplasm of breast    Internal derangement of knee, left    S/P left knee arthroscopy     Contusion of left knee, initial encounter    Personal history of malignant melanoma

## 2025-04-25 NOTE — DISCHARGE INSTRUCTIONS
POST-COLONOSCOPY DISCHARGE INSTRUCTIONS    PROCEDURE PERFORMED: Colonoscopy, screening    ENDOSCOPIST: Milady Aquino MD    FINDINGS: Diverticulosis and Internal hemorrhoids    MEDICATIONS: You may resume all other medications today    DIET: You may resume your regular diet today.    BIOPSIES: No biopsies were taken    X-RAYS: No X-rays/Labs were ordered today        Activity for remainder of today:  REST TODAY  DO NOT drive or operate heavy machinery  DO NOT drink any alcoholic beverages  DO NOT sign any legal documents or make any important decisions    After your procedure(s):  It is not unusual to feel bloated or gassy .  Passing gas and belching is encouraged. Lying on your left side with your knees flexed may relieve the discomfort. A hot pack to the abdomen may also help. If you had an upper endoscopy (EGD), you may experience a slight sore throat which will subside. Throat lozenges or salt water gargle can be used.    FOLLOW-UP:  Contact the office at [phone] if a follow-up appointment is needed or if you develop any of the following:    Severe abdominal pain/discomfort   Excessive bleeding  Black tarry stool  Difficulty breathing/swallowing  Persistent nausea/vomiting    Fever above 100 degrees or chills

## 2025-04-25 NOTE — ANESTHESIA PREPROCEDURE EVALUATION
Anesthesia PreOp Note    HPI:     Lily Palma is a 44 year old female who presents for preoperative consultation requested by: Milady Aquino MD    Date of Surgery: 4/25/2025    Procedure(s):  COLONOSCOPY  Indication: Special screening for malignant neoplasms, colon    Relevant Problems   No relevant active problems       NPO:  Last Liquid Consumption Date: 04/25/25  Last Liquid Consumption Time: 0230  Last Solid Consumption Date: 04/23/25  Last Solid Consumption Time: 1830  Last Liquid Consumption Date: 04/25/25          History Review:  Patient Active Problem List    Diagnosis Date Noted    Personal history of malignant melanoma 12/15/2022    S/P left knee arthroscopy 02/21/2022    Contusion of left knee, initial encounter 02/21/2022    Internal derangement of knee, left 01/04/2022    Family history of malignant neoplasm of breast 10/28/2021    Plantar fasciitis of left foot 11/20/2019    AC (acromioclavicular) arthritis 03/23/2017    Right shoulder pain, unspecified chronicity 12/02/2016    Shoulder instability, right 11/11/2016    Cervical radicular pain 11/11/2016    Neck pain on left side 05/26/2015    Spasm of cervical paraspinous muscle 05/14/2015    AC (acromioclavicular) joint arthritis 05/14/2015    Rotator cuff impingement syndrome, left 05/14/2015       Past Medical History[1]    Past Surgical History[2]    Prescriptions Prior to Admission[3]  Current Medications and Prescriptions Ordered in Epic[4]    Allergies[5]    Family History[6]  Social Hx on file[7]    Available pre-op labs reviewed.  Lab Results   Component Value Date    URINEPREG Negative 04/25/2025             Vital Signs:  Body mass index is 47.49 kg/m².   height is 1.778 m (5' 10\") and weight is 150.1 kg (331 lb) (abnormal). Her blood pressure is 154/78 and her pulse is 86. Her respiration is 16 and oxygen saturation is 96%.   Vitals:    04/17/25 1734 04/25/25 0743   BP:  154/78   Pulse:  86   Resp:  16   SpO2:  96%   Weight: (!) 150.1 kg  (331 lb)    Height: 1.778 m (5' 10\")         Anesthesia Evaluation     Patient summary reviewed    Airway   Mallampati: III  TM distance: <3 FB  Neck ROM: full  Dental      Pulmonary    Cardiovascular   Exercise tolerance: good  (+) hypertension well controlled    Neuro/Psych    (+)  neuromuscular disease,        GI/Hepatic/Renal      Endo/Other    Abdominal                  Anesthesia Plan:   ASA:  4  Plan:   MAC  Informed Consent Plan and Risks Discussed With:  Patient      I have informed Lily Palma and/or legal guardian or family member of the nature of the anesthetic plan, benefits, risks including possible dental damage if relevant, major complications, and any alternative forms of anesthetic management.   All of the patient's questions were answered to the best of my ability. The patient desires the anesthetic management as planned.  Rafael Lizarraga MD  4/25/2025 8:00 AM  Present on Admission:  **None**           [1]   Past Medical History:   Cancer (HCC)    Melanoma Stage 1 Upper left back    High blood pressure    Visual impairment    contacts/glasses   [2]   Past Surgical History:  Procedure Laterality Date    Arthroscopy of joint unlisted Left     knee scope     Hernia surgery      umbilical     Lumpectomy right  09/2018    Ductal Hyperplasia. No lymph nodes removed    Lumpectomy right  12/2022    Ductal Hyperplasia    Other surgical history Left 2000, 2003    A-PMM x2    Other surgical history Right 08/2011    wrist ganglion cyst removed    Other surgical history Left 2005, 2007    foot ganglion cyst removed x2    Other surgical history  09/25/2018    Right breast lumpectomy, right bresat deep margin excision    Tonsillectomy     [3]   Medications Prior to Admission   Medication Sig Dispense Refill Last Dose/Taking    sertraline 50 MG Oral Tab Take 1.5 tablets (75 mg total) by mouth daily. 75MG TOTAL   4/24/2025    Cholecalciferol (VITAMIN D) 50 MCG (2000 UT) Oral Cap Take by mouth.   4/18/2025     losartan 50 MG Oral Tab Take 1 tablet (50 mg total) by mouth at bedtime.   2025    Omega-3 Fatty Acids (FISH OIL OR) Take by mouth.   2025    Multiple Vitamins-Minerals (MULTI-VITAMIN/MINERALS) Oral Tab Take 1 tablet by mouth in the morning.   2025   [4]   Current Facility-Administered Medications Ordered in Epic   Medication Dose Route Frequency Provider Last Rate Last Admin    lactated ringers infusion   Intravenous Continuous Milady Aquino MD         No current [facility]-ordered outpatient medications on file.   [5] No Known Allergies  [6]   Family History  Problem Relation Age of Onset    Diabetes Father     Breast Cancer Mother 49        DCIS, ER+; BRCA1/2 neg     Cancer Maternal Grandmother 70        lung ca; smoker    Cancer Paternal Grandfather 77        leukemia   [7]   Social History  Socioeconomic History    Marital status:    Tobacco Use    Smoking status: Former     Current packs/day: 0.00     Types: Cigarettes     Quit date: 2013     Years since quittin.3    Smokeless tobacco: Never    Tobacco comments:     smoked 10 years   Vaping Use    Vaping status: Never Used   Substance and Sexual Activity    Alcohol use: Yes     Comment: occasional/ 1-2 drinks a week     Drug use: No

## 2025-04-25 NOTE — ANESTHESIA POSTPROCEDURE EVALUATION
Patient: Lily Palma    Procedure Summary       Date: 04/25/25 Room / Location: [facility] ENDOSCOPY 05 / EM ENDOSCOPY    Anesthesia Start: 0806 Anesthesia Stop: 0828    Procedure: COLONOSCOPY Diagnosis:       Special screening for malignant neoplasms, colon      (diverticulosis, hemorrhoids)    Surgeons: Milady Aquino MD Anesthesiologist: Rafael Lizarraga MD    Anesthesia Type: MAC ASA Status: 4            Anesthesia Type: No value filed.    Vitals Value Taken Time   /67 04/25/25 08:29   Temp 97.4 04/25/25 08:29   Pulse 66 04/25/25 08:29   Resp 14 04/25/25 08:29   SpO2 99 04/25/25 08:29       [facility] AN Post Evaluation:   Patient Evaluated in PACU  Patient Participation: complete - patient participated  Level of Consciousness: awake  Pain Score: 0  Pain Management: adequateYes    Nausea/Vomiting: none  Cardiovascular Status: acceptable  Respiratory Status: acceptable  Postoperative Hydration acceptable      Rafael Lizarraga MD  4/25/2025 8:29 AM

## 2025-04-25 NOTE — OPERATIVE REPORT
Colonoscopy Operative Report    Lily Palma Patient Status:  [facility] Surgery    1980 MRN [id number]   Location [facility] ENDOSCOPY LAB SUITES Attending Milady Aquino MD    Day #   0 PCP Navarro Linares MD     Pre-Operative Diagnosis: Special screening for malignant neoplasms, colon    Date of previous colonoscopy: none    Post-Operative Diagnosis:   Diverticulosis and Internal Hemorrhoids     Procedure Performed:   Colonoscopy, screening      Pre-procedure: The patient was assessed in the procedure room immediately before induction of sedation which included, at a minimum, vital signs, NPO status, and airway assessment.  The patient was deemed and appropriate candidate for procedural sedation.    Informed Consent: Informed consent for both procedures and sedation were obtained from the patient.  The risks, benefits and alternatives to the procedures including potentially life-threatening complications of sedation, bleeding, perforation, missed lesions or need for repeat endoscopy were reviewed along with the possible need for hospitalization, surgical management, transfusion of blood products or repeat endoscopy should one of these complications arise.  The patient and/or POA voiced their understanding and was agreeable to proceed.     Sedation Type: MAC-Patient received sedation with monitored anesthesia provided by an anesthesiologist    Colonoscopy Procedure Description: The patient was placed in the left lateral decubitus position.  After careful digital rectal examination, the Adult colonoscope was inserted into the rectum under direct vision and advanced to the level of the cecum under direct visualization. The cecum was identified by landmarks, including the appendiceal orifice and ileoceccal valve. Careful examination of the entire colon was performed during withdrawal of the endoscope. The scope was withdrawn to the rectum and  retroflexion was performed.  The colonoscopy was performed without difficulty and the patient tolerated the procedure well with no immediate complications. The patient was transferred to the recovery area in stable condition.     Quality of Preparation/Aronchick Bowel Prep Scale: 2: Good (Clear liquid covering 5%-25% of mucosa, but >90% of mucosa seen)  Cecum Withdrawal Time: 6 min    Findings: Few small diverticuli were noted throughout the colon and small internal hemorrhoids found on retroflexion.  Remainder of the exam was otherwise unremarkable with no polyps or masses seen    Impression:   Diverticulosis and Internal hemorrhoids    Recommendations: Discharge patient when discharge criteria are met. and Repeat colonoscopy for routine screening in 10 years (or sooner if new family history of personal issues arise)    Discharge:  The patient was given an after visit summary detailing the procedure, findings, recommendations and follow up plans.     Milady Aquino MD  4/25/2025  8:22 AM

## (undated) DEVICE — 60 ML SYRINGE REGULAR TIP: Brand: MONOJECT

## (undated) DEVICE — MEDI-VAC NON-CONDUCTIVE SUCTION TUBING 6MM X 1.8M (6FT.) L: Brand: CARDINAL HEALTH

## (undated) DEVICE — ARTHROSCOPY: Brand: MEDLINE INDUSTRIES, INC.

## (undated) DEVICE — DISPOSABLE TOURNIQUET CUFF SINGLE BLADDER, DUAL PORT AND QUICK CONNECT CONNECTOR: Brand: COLOR CUFF

## (undated) DEVICE — Device

## (undated) DEVICE — TUBING IRR 16FT CNT WV 3 ASCP

## (undated) DEVICE — BLADE SHVR 13CM 4MM EXCLBR

## (undated) DEVICE — GAMMEX® PI HYBRID SIZE 7.5, STERILE POWDER-FREE SURGICAL GLOVE, POLYISOPRENE AND NEOPRENE BLEND: Brand: GAMMEX

## (undated) DEVICE — BNDG COHESIVE W4INXL5YD TAN E

## (undated) DEVICE — SOLUTION  .9 3000ML

## (undated) DEVICE — V2 SPECIMEN COLLECTION MANIFOLD KIT: Brand: NEPTUNE

## (undated) DEVICE — DRAPE ARTHROSCOPY KNEE

## (undated) DEVICE — KIT CLEAN ENDOKIT 1.1OZ GOWNX2

## (undated) DEVICE — SUT ETHILON 3-0 FS-1 669H

## (undated) DEVICE — APPLICATOR CHLORAPREP 26ML

## (undated) DEVICE — SUCTION CANISTER, 3000CC,SAFELINER: Brand: DEROYAL

## (undated) DEVICE — KIT ENDO ORCAPOD 160/180/190

## (undated) NOTE — LETTER
[facility]  Administration of Anesthesia  ILily agree to be cared for by a physician anesthesiologist alone and/or with a nurse anesthetist, who is specially trained to monitor me and give me medicine to put me to sleep or keep me comfortable during my procedure    I understand that my anesthesiologist and/or anesthetist is not an employee or agent of [facility] or [employer]. He or she works for [facility]    As the patient asking for anesthesia services, I agree to:  Allow the anesthesiologist (anesthesia doctor) to give me medicine and do additional procedures as necessary. Some examples are: Starting or using an “IV” to give me medicine, fluids or blood during my procedure, and having a breathing tube placed to help me breathe when I’m asleep (intubation). In the event that my heart stops working properly, I understand that my anesthesiologist will make every effort to sustain my life, unless otherwise directed by [facility] Do Not Resuscitate documents.  Tell my anesthesia doctor before my procedure:  If I am pregnant.  The last time that I ate or drank.  iii. All of the medicines I take (including prescriptions, herbal supplements, and pills I can buy without a prescription (including street drugs/illegal medications). Failure to inform my anesthesiologist about these medicines may increase my risk of anesthetic complications.  iv.If I am allergic to anything or have had a reaction to anesthesia before.  I understand how the anesthesia medicine will help me (benefits).  I understand that with any type of anesthesia medicine there are risks:  The most common risks are: nausea, vomiting, sore throat, muscle soreness, damage to my eyes, mouth, or teeth (from breathing tube placement).  Rare risks include: remembering what happened during my procedure, allergic reactions to medications, injury to my airway, heart, lungs, vision, nerves, or  muscles and in extremely rare instances death.  My doctor has explained to me other choices available to me for my care (alternatives).  Pregnant Patients (“epidural”):  I understand that the risks of having an epidural (medicine given into my back to help control pain during labor), include itching, low blood pressure, difficulty urinating, headache or slowing of the baby’s heart. Very rare risks include infection, bleeding, seizure, irregular heart rhythms and nerve injury.  Regional Anesthesia (“spinal”, “epidural”, & “nerve blocks”):  I understand that rare but potential complications include headache, bleeding, infection, seizure, irregular heart rhythms, and nerve injury.    _____________________________________________________________________________  Patient (or Representative) Signature/Relationship to Patient  Date   Time    _____________________________________________________________________________   Name (if used)    Language/Organization   Time    _____________________________________________________________________________  Nurse Anesthetist Signature     Date   Time  _____________________________________________________________________________  Anesthesiologist Signature     Date   Time  I have discussed the procedure and information above with the patient (or patient’s representative) and answered their questions. The patient or their representative has agreed to have anesthesia services.    _____________________________________________________________________________  Witness        Date   Time  I have verified that the signature is that of the patient or patient’s representative, and that it was signed before the procedure  Patient Name: Lily Palma     : 1980                 Printed: 2025 at 9:45 AM    Medical Record #: [id number]                                            Page 1 of 1  ----------ANESTHESIA CONSENT----------

## (undated) NOTE — LETTER
[facility]  [address], [place], [place]    Authorization for Surgical Operation and Procedure                               I hereby authorize Milady Aquino MD, my physician and his/her assistants (if applicable), which may include medical students, residents, and/or fellows, to perform the following surgical operation/ procedure and administer such anesthesia as may be determined necessary by my physician: Operation/Procedure name (s) COLONOSCOPY on Lily Palma   2.   I recognize that during the surgical operation/procedure, unforeseen conditions may necessitate additional or different procedures than those listed above.  I, therefore, further authorize and request that the above-named surgeon, assistants, or designees perform such procedures as are, in their judgment, necessary and desirable.    3.   My surgeon/physician has discussed prior to my surgery the potential benefits, risks and side effects of this procedure; the likelihood of achieving goals; and potential problems that might occur during recuperation.  They also discussed reasonable alternatives to the procedure, including risks, benefits, and side effects related to the alternatives and risks related to not receiving this procedure.  I have had all my questions answered and I acknowledge that no guarantee has been made as to the result that may be obtained.    4.   Should the need arise during my operation/procedure, which includes change of level of care prior to discharge, I also consent to the administration of blood and/or blood products.  Further, I understand that despite careful testing and screening of blood or blood products by collecting agencies, I may still be subject to ill effects as a result of receiving a blood transfusion and/or blood products.  The following are some, but not all, of the potential risks that can occur: fever and allergic reactions, hemolytic reactions, transmission of diseases such as  Hepatitis, AIDS and Cytomegalovirus (CMV) and fluid overload.  In the event that I wish to have an autologous transfusion of my own blood, or a directed donor transfusion, I will discuss this with my physician.  Check only if Refusing Blood or Blood Products  I understand refusal of blood or blood products as deemed necessary by my physician may have serious consequences to my condition to include possible death. I hereby assume responsibility for my refusal and release the hospital, its personnel, and my physicians from any responsibility for the consequences of my refusal.    o  Refuse   5.   I authorize the use of any specimen, organs, tissues, body parts or foreign objects that may be removed from my body during the operation/procedure for diagnosis, research or teaching purposes and their subsequent disposal by hospital authorities.  I also authorize the release of specimen test results and/or written reports to my treating physician on the hospital medical staff or other referring or consulting physicians involved in my care, at the discretion of the Pathologist or my treating physician.    6.   I consent to the photographing or videotaping of the operations or procedures to be performed, including appropriate portions of my body for medical, scientific, or educational purposes, provided my identity is not revealed by the pictures or by descriptive texts accompanying them.  If the procedure has been photographed/videotaped, the surgeon will obtain the original picture, image, videotape or CD.  The hospital will not be responsible for storage, release or maintenance of the picture, image, tape or CD.    7.   I consent to the presence of a  or observers in the operating room as deemed necessary by my physician or their designees.    8.   I recognize that in the event my procedure results in extended X-Ray/fluoroscopy time, I may develop a skin reaction.    9. If I have a Do Not Attempt  Resuscitation (DNAR) order in place, that status will be suspended while in the operating room, procedural suite, and during the recovery period unless otherwise explicitly stated by me (or a person authorized to consent on my behalf). The surgeon or my attending physician will determine when the applicable recovery period ends for purposes of reinstating the DNAR order.  10. Patients having a sterilization procedure: I understand that if the procedure is successful the results will be permanent and it will therefore be impossible for me to inseminate, conceive, or bear children.  I also understand that the procedure is intended to result in sterility, although the result has not been guaranteed.   11. I acknowledge that my physician has explained sedation/analgesia administration to me including the risk and benefits I consent to the administration of sedation/analgesia as may be necessary or desirable in the judgment of my physician.    I CERTIFY THAT I HAVE READ AND FULLY UNDERSTAND THE ABOVE CONSENT TO OPERATION and/or OTHER PROCEDURE.     ____________________________________  _________________________________        ______________________________  Signature of Patient    Signature of Responsible Person                Printed Name of Responsible Person                                      ____________________________________  _____________________________                ________________________________  Signature of Witness        Date  Time         Relationship to Patient    STATEMENT OF PHYSICIAN My signature below affirms that prior to the time of the procedure; I have explained to the patient and/or his/her legal representative, the risks and benefits involved in the proposed treatment and any reasonable alternative to the proposed treatment. I have also explained the risks and benefits involved in refusal of the proposed treatment and alternatives to the proposed treatment and have answered the patient's  questions. If I have a significant financial interest in a co-management agreement or a significant financial interest in any product or implant, or other significant relationship used in this procedure/surgery, I have disclosed this and had a discussion with my patient.     _____________________________________________________              _____________________________  (Signature of Physician)                                                                                         (Date)                                   (Time)  Patient Name: Lily Palma      : 1980      Printed: 2025     Medical Record #: [id number]                                      Page 1 of 1